# Patient Record
Sex: FEMALE | Race: WHITE | Employment: FULL TIME | URBAN - METROPOLITAN AREA
[De-identification: names, ages, dates, MRNs, and addresses within clinical notes are randomized per-mention and may not be internally consistent; named-entity substitution may affect disease eponyms.]

---

## 2020-10-26 ENCOUNTER — HOSPITAL ENCOUNTER (EMERGENCY)
Age: 30
Discharge: HOME | End: 2020-10-26
Attending: EMERGENCY MEDICINE
Payer: MEDICAID

## 2020-10-26 VITALS
BODY MASS INDEX: 25.8 KG/M2 | RESPIRATION RATE: 16 BRPM | SYSTOLIC BLOOD PRESSURE: 126 MMHG | WEIGHT: 140.21 LBS | OXYGEN SATURATION: 100 % | TEMPERATURE: 99 F | DIASTOLIC BLOOD PRESSURE: 82 MMHG | HEART RATE: 96 BPM | HEIGHT: 62 IN

## 2020-10-26 PROCEDURE — 99282 EMERGENCY DEPT VISIT SF MDM: CPT

## 2020-10-26 RX ORDER — LORAZEPAM 0.5 MG/1
TABLET ORAL EVERY 6 HOURS PRN
COMMUNITY

## 2020-10-26 RX ORDER — AZITHROMYCIN 250 MG/1
TABLET, FILM COATED ORAL
Qty: 1 PACKET | Refills: 0 | Status: SHIPPED | OUTPATIENT
Start: 2020-10-26 | End: 2020-10-30

## 2020-10-26 RX ORDER — BUPROPION HYDROCHLORIDE 300 MG/1
300 TABLET ORAL NIGHTLY
COMMUNITY
End: 2022-01-07

## 2020-10-26 RX ORDER — TRAZODONE HYDROCHLORIDE 300 MG/1
300 TABLET ORAL NIGHTLY PRN
COMMUNITY

## 2020-10-26 RX ORDER — SERTRALINE HYDROCHLORIDE 100 MG/1
200 TABLET, FILM COATED ORAL NIGHTLY
COMMUNITY
End: 2022-01-07

## 2020-10-26 ASSESSMENT — PAIN SCALES - GENERAL
PAINLEVEL_OUTOF10: 6
PAINLEVEL_OUTOF10: 1

## 2020-10-26 ASSESSMENT — PAIN DESCRIPTION - ORIENTATION
ORIENTATION: RIGHT;LEFT
ORIENTATION: RIGHT;LEFT

## 2020-10-26 ASSESSMENT — PAIN DESCRIPTION - DESCRIPTORS
DESCRIPTORS: ACHING
DESCRIPTORS: ACHING

## 2020-10-26 ASSESSMENT — PAIN DESCRIPTION - FREQUENCY
FREQUENCY: CONTINUOUS
FREQUENCY: CONTINUOUS

## 2020-10-26 ASSESSMENT — PAIN DESCRIPTION - PAIN TYPE
TYPE: ACUTE PAIN
TYPE: ACUTE PAIN

## 2020-10-26 ASSESSMENT — PAIN DESCRIPTION - LOCATION
LOCATION: EAR
LOCATION: EAR

## 2020-10-26 ASSESSMENT — PAIN - FUNCTIONAL ASSESSMENT: PAIN_FUNCTIONAL_ASSESSMENT: 0-10

## 2020-10-26 NOTE — ED PROVIDER NOTES
[facility]  eMERGENCY dEPARTMENT eNCOUnter      Pt Name: Camila Becker  MRN: [id number]  [place] 1990  Date of evaluation: 10/26/2020  Provider: Minnie Norman MD    [address] [place] [address]       Chief Complaint   Patient presents with   Wava Nip     c/o ear pain x 5 days. C/o headache. Denies fever. Vomited x 1 today. Denies n/v now          HISTORY OF PRESENT ILLNESS  (Location/Symptom, Timing/Onset, Context/Setting, Quality, Duration, Modifying Factors, Severity.)   Camila Becker is a 31 y.o. female who presents to the emergency department complaining of bilateral ear pain and pressure for about 5 days. She has some facial pressure and pain. She has had some nasal congestion. She has had yellow and clear drainage from her nose. She denies fever. She has had a headache. She vomited once today, but she is not nauseated anymore. No abdominal pain. Nursing Notes were reviewed and I agree. REVIEW OF SYSTEMS    (2-9 systems for level 4, 10 or more for level 5)     General: No fever or chills. ENT: Bilateral ear pain, nasal congestion. Respiratory: No shortness of breath or cough. GI: Nausea, vomited once earlier today. No abdominal pain. No diarrhea. Except as noted above the remainder of the review of systems was reviewed and negative. PAST MEDICAL HISTORY   History reviewed. No pertinent past medical history. SURGICAL HISTORY           Procedure Laterality Date    BRAIN SURGERY      HYSTERECTOMY         CURRENT MEDICATIONS       Previous Medications    BUPROPION (WELLBUTRIN XL) 300 MG EXTENDED RELEASE TABLET    Take 300 mg by mouth nightly    LORAZEPAM (ATIVAN) 0.5 MG TABLET    Take by mouth every 6 hours as needed. SERTRALINE (ZOLOFT) 100 MG TABLET    Take 200 mg by mouth nightly    TRAZODONE (DESYREL) 300 MG TABLET    Take 300 mg by mouth nightly as needed       ALLERGIES     Amoxicillin; Augmentin [amoxicillin-pot clavulanate];  Ceclor bilateral suppurative otitis media on exam.  She has some URI symptoms with nasal congestion. She has a benign abdomen, she had one episode of vomiting earlier today. She has no abdominal pain or tenderness. She has multiple antibiotic allergies. I put her on azithromycin for otitis media. I put her on some loratadine D for congestion and eustachian tube dysfunction. She will follow-up if not improved. She will return if worse or new symptoms develop. Diagnosis and treatment plan were discussed with the patient. She understands the treatment plan and follow-up as discussed. PROCEDURES:  None    FINAL IMPRESSION      1. Acute suppurative otitis media of both ears without spontaneous rupture of tympanic membranes, recurrence not specified          DISPOSITION/PLAN   DISPOSITION Decision To Discharge 10/26/2020 07:14:31 PM      PATIENT REFERRED TO:  [facility]) Pre-Services  [phone]          DISCHARGE MEDICATIONS:  New Prescriptions    AZITHROMYCIN (ZITHROMAX Z-MICAELA) 250 MG TABLET    Take 2 tablets (500 mg) on Day 1, and then take 1 tablet (250 mg) on days 2 through 5.     LORATADINE-PSEUDOEPHEDRINE (CLARITIN-D 12HR) 5-120 MG PER EXTENDED RELEASE TABLET    Take 1 tablet by mouth 2 times daily       (Please note that portions of this note were completed with a voice recognition program.  Efforts were made to edit the dictations but occasionally words are mis-transcribed.)    Jesus Sin MD  Attending Emergency Physician        Billy Torres MD  10/26/20 3339

## 2020-10-26 NOTE — ED NOTES
Gave patient discharge instructions. She states, understanding. Patient discharged to home      Kourtney Lackey RN  10/26/20 1931

## 2020-11-16 ENCOUNTER — HOSPITAL ENCOUNTER (EMERGENCY)
Age: 30
Discharge: HOME | End: 2020-11-16
Attending: EMERGENCY MEDICINE
Payer: MEDICAID

## 2020-11-16 VITALS
HEART RATE: 83 BPM | SYSTOLIC BLOOD PRESSURE: 114 MMHG | HEIGHT: 63 IN | TEMPERATURE: 98.3 F | WEIGHT: 142.8 LBS | OXYGEN SATURATION: 97 % | BODY MASS INDEX: 25.3 KG/M2 | DIASTOLIC BLOOD PRESSURE: 80 MMHG | RESPIRATION RATE: 17 BRPM

## 2020-11-16 PROCEDURE — U0003 INFECTIOUS AGENT DETECTION BY NUCLEIC ACID (DNA OR RNA); SEVERE ACUTE RESPIRATORY SYNDROME CORONAVIRUS 2 (SARS-COV-2) (CORONAVIRUS DISEASE [COVID-19]), AMPLIFIED PROBE TECHNIQUE, MAKING USE OF HIGH THROUGHPUT TECHNOLOGIES AS DESCRIBED BY CMS-2020-01-R: HCPCS

## 2020-11-16 PROCEDURE — U0002 COVID-19 LAB TEST NON-CDC: HCPCS

## 2020-11-16 PROCEDURE — 99283 EMERGENCY DEPT VISIT LOW MDM: CPT

## 2020-11-16 RX ORDER — CLINDAMYCIN HYDROCHLORIDE 300 MG/1
300 CAPSULE ORAL 3 TIMES DAILY
Qty: 21 CAPSULE | Refills: 0 | Status: SHIPPED | OUTPATIENT
Start: 2020-11-16 | End: 2020-11-23

## 2020-11-16 ASSESSMENT — PAIN DESCRIPTION - LOCATION
LOCATION: EAR
LOCATION: EAR

## 2020-11-16 ASSESSMENT — PAIN DESCRIPTION - FREQUENCY
FREQUENCY: CONTINUOUS
FREQUENCY: CONTINUOUS

## 2020-11-16 ASSESSMENT — PAIN DESCRIPTION - DESCRIPTORS
DESCRIPTORS: ACHING
DESCRIPTORS: ACHING

## 2020-11-16 ASSESSMENT — PAIN DESCRIPTION - PAIN TYPE
TYPE: ACUTE PAIN
TYPE: ACUTE PAIN

## 2020-11-16 ASSESSMENT — PAIN SCALES - GENERAL
PAINLEVEL_OUTOF10: 3
PAINLEVEL_OUTOF10: 3

## 2020-11-16 ASSESSMENT — PAIN - FUNCTIONAL ASSESSMENT: PAIN_FUNCTIONAL_ASSESSMENT: ACTIVITIES ARE NOT PREVENTED

## 2020-11-16 ASSESSMENT — PAIN DESCRIPTION - ORIENTATION
ORIENTATION: RIGHT
ORIENTATION: RIGHT

## 2020-11-16 NOTE — ED PROVIDER NOTES
CHIEF COMPLAINT  Otalgia (right ear pain. Just finished recent round of antibiotics for bilateral ear infection.) and Concern For COVID-19 (Lost her sense of taste and smell.)      HISTORY OF PRESENT ILLNESS  Laila Wheeler is a 31 y.o. female who presents to the ED complaining of right ear pain this been present over the past few days. Patient states that she has history of recurrent ear infections completed a course of antibiotics 1 week ago. Patient states that she has had throbbing pain in her right ear consistently over the past week. No drainage from the ear. Denies any fevers or chills. No nausea or vomiting. No chest pain or shortness of breath. States she has had decreased taste and smell over the past day. 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  Patient states that her work told her that she had to go to an urgent care to get a Covid test.  Denies any neck pain or headaches. No vision changes. No other complaints, modifying factors or associated symptoms. Nursing notes reviewed. History reviewed. No pertinent past medical history. Past Surgical History:   Procedure Laterality Date    BRAIN SURGERY      HYSTERECTOMY       History reviewed. No pertinent family history.   Social History     Socioeconomic History    Marital status: Single     Spouse name: Not on file    Number of children: Not on file    Years of education: Not on file    Highest education level: Not on file   Occupational History    Not on file   Social Needs    Financial resource strain: Not on file    Food insecurity     Worry: Not on file     Inability: Not on file    Transportation needs     Medical: Not on file     Non-medical: Not on file   Tobacco Use    Smoking status: Never Smoker    Smokeless tobacco: Never Used   Substance and Sexual Activity    Alcohol use: Yes     Comment: socially  Drug use: No    Sexual activity: Not Currently   Lifestyle    Physical activity     Days per week: Not on file     Minutes per session: Not on file    Stress: Not on file   Relationships    Social connections     Talks on phone: Not on file     Gets together: Not on file     Attends Quaker service: Not on file     Active member of club or organization: Not on file     Attends meetings of clubs or organizations: Not on file     Relationship status: Not on file    Intimate partner violence     Fear of current or ex partner: Not on file     Emotionally abused: Not on file     Physically abused: Not on file     Forced sexual activity: Not on file   Other Topics Concern    Not on file   Social History Narrative    Not on file     No current facility-administered medications for this encounter. Current Outpatient Medications   Medication Sig Dispense Refill    clindamycin (CLEOCIN) 300 MG capsule Take 1 capsule by mouth 3 times daily for 7 days 21 capsule 0    buPROPion (WELLBUTRIN XL) 300 MG extended release tablet Take 300 mg by mouth nightly      traZODone (DESYREL) 300 MG tablet Take 300 mg by mouth nightly as needed      sertraline (ZOLOFT) 100 MG tablet Take 200 mg by mouth nightly      loratadine-pseudoephedrine (CLARITIN-D 12HR) 5-120 MG per extended release tablet Take 1 tablet by mouth 2 times daily 20 tablet 0    LORazepam (ATIVAN) 0.5 MG tablet Take by mouth every 6 hours as needed.        Allergies   Allergen Reactions    Amoxicillin     Augmentin [Amoxicillin-Pot Clavulanate] Hives    Ceclor [Cefaclor]     Pcn [Penicillins]     Sumatriptan Other (See Comments)     Elevated heart rate    Prochlorperazine Anxiety         REVIEW OF SYSTEMS  10 systems reviewed, pertinent positives per HPI otherwise noted to be negative    PHYSICAL EXAM  /80   Pulse 83   Temp 98.3 °F (36.8 °C) (Oral)   Resp 17   Ht 5' 2.5\" (1.588 m)   Wt 142 lb 12.8 oz (64.8 kg)   LMP 03/02/2012   SpO2 97%   BMI 25.70 kg/m²      CONSTITUTIONAL: AOx4, cooperative with exam, afebrile   HEAD: normocephalic, atraumatic   EYES: PERRL, EOMI, anicteric sclera   ENT: Moist mucous membranes, uvula midline, no swelling of the posterior pharynx, right TM bulging with fluid behind it and mild erythema of the canal, no external ear tenderness bilaterally   NECK: Supple, symmetric, trachea midline, no meningismus   LUNGS: Bilateral breath sounds, CTAB, no rales/ronchi/wheezes   CARDIOVASCULAR: RRR, normal S1/S2, no m/r/g, 2+ pulses throughout   ABDOMEN: Soft, non-tender, non-distended, +BS   NEUROLOGIC:  MAEx4, GCS 15   MUSCULOSKELETAL: No clubbing, cyanosis or edema   SKIN: No rash, pallor or wounds on exposed surfaces         RADIOLOGY  X-RAYS:  I have reviewed radiologic plain film image(s). ALL OTHER NON-PLAIN FILM IMAGES SUCH AS CT, ULTRASOUND AND MRI HAVE BEEN READ BY THE RADIOLOGIST. No orders to display          EKG INTERPRETATION  None    PROCEDURES    ED COURSE/MDM  Otitis media, otitis externa, eustachian tube dysfunction  COVID-19, viral syndrome, other  Patient seen and evaluated. History and physical as above. Nontoxic, afebrile. Patient with right otitis media on physical exam.  Does have a history of recurrent otitis media and recent antibiotic use. Will start patient on clindamycin and stressed the importance of outpatient follow-up with her ENT specialist.  Patient agreeable. Patient also provided PCP referral.  COVID-19 test performed today in the emergency room. Patient instructed to self isolate till she finds out the results of her testing. Discussed and well-hydrated. Patient states understanding. Return instruction provided. All questions answered prior to discharge.         I estimate there is LOW risk for ACUTE CORONARY SYNDROME, CHRONIC OBSTRUCTIVE PULMONARY DISEASE, PERICARDIAL TAMPONADE, PNEUMONIA, PULMONARY EMBOLISM, SEPSIS, and AIRWAY COMPROMISE,  thus I consider the discharge disposition reasonable. Chaya Snow and I have discussed the diagnosis and risks, and we agree with discharging home to follow-up with their primary doctor. We also discussed returning to the Emergency Department immediately if new or worsening symptoms occur. We have discussed the symptoms which are most concerning (e.g., bloody sputum, fever, worsening pain or shortness of breath, vomiting) that necessitate immediate return. Patient was given scripts for the following medications. I counseled patient how to take these medications. New Prescriptions    CLINDAMYCIN (CLEOCIN) 300 MG CAPSULE    Take 1 capsule by mouth 3 times daily for 7 days           CLINICAL IMPRESSION  1. Recurrent acute suppurative otitis media of right ear without spontaneous rupture of tympanic membrane    2. Loss of taste        Blood pressure 114/80, pulse 83, temperature 98.3 °F (36.8 °C), temperature source Oral, resp. rate 17, height 5' 2.5\" (1.588 m), weight 142 lb 12.8 oz (64.8 kg), last menstrual period 03/02/2012, SpO2 97 %. DISPOSITION  Patient was discharged to home in good condition. Megan Grubbsmorenaldo  [phone]  Call today  For a follow up appointment. Disclaimer: All medical record entries made by [employer] dictation.       (Please note that this note was completed with a voice recognition program. Every attempt was made to edit the dictations, but inevitably there remain words that are mis-transcribed.)           Ashley Calles MD  11/16/20 5827

## 2020-11-16 NOTE — LETTER
[facility] [address]  Phone: [phone]               November 16, 2020    Patient: Hugh Hernández   YOB: 1990   Date of Visit: 11/16/2020       To Whom It May Concern:    Aretha Obando was seen and treated in our emergency department on 11/16/2020. She may not return to work until her Covid test is resulted. If it is positive, she must be off work a minimum of 10 days.       Sincerely,       Dr. Melissa Francisco        Signature:__________________________________

## 2020-11-17 ENCOUNTER — CARE COORDINATION (OUTPATIENT)
Dept: CARE COORDINATION | Age: 30
End: 2020-11-17

## 2020-11-17 LAB — SARS-COV-2, PCR: NOT DETECTED

## 2020-11-17 NOTE — CARE COORDINATION
Attempted outreach call for ED f/u and COVID-19 monitoring; left a VM with [facility] call-back information. Her COVID test is still PENDING at this time. No future appointments. Rasheeda YOUNG, RN  Ambulatory Care Manager  [phone]  [email or web address]

## 2020-11-17 NOTE — CARE COORDINATION
Patient contacted regarding recent discharge and COVID-19 risk. Discussed COVID-19 related testing which was available at this time. Test results were negative. Patient informed of results, if available? Yes     Care Transition Nurse/ Ambulatory Care Manager contacted the patient by telephone to perform post discharge assessment. Verified name and  with patient as identifiers. She is relieved to hear that her COVID test was negative. She is going to look on [employer] and send the results to her employer. Denies any new symptoms today. She is taking clindamycin for her ear infection. Patient has following risk factors of: no known risk factors. CTN/ACM reviewed discharge instructions, medical action plan and red flags related to discharge diagnosis. Reviewed and educated them on any new and changed medications related to discharge diagnosis. Advised obtaining a 90-day supply of all daily and as-needed medications. Education provided regarding infection prevention, and signs and symptoms of COVID-19 and when to seek medical attention with patient who verbalized understanding. Discussed exposure protocols and quarantine from [address] you at higher risk for severe illness  and given an opportunity for questions and concerns. The patient agrees to contact the COVID-19 hotline [phone] or PCP office for questions related to their healthcare. CTN/ACM provided contact information for future reference. From CDC: Are you at higher risk for severe illness?  Wash your hands often.  Avoid close contact (6 feet, which is about two arm lengths) with people who are sick.  Put distance between yourself and other people if COVID-19 is spreading in your community.  Clean and disinfect frequently touched surfaces.  Avoid all cruise travel and non-essential air travel.    Call your healthcare professional if you have concerns about COVID-19 and your underlying condition or if you are sick.    For more information on steps you can take to protect yourself, see CDC's How to [address] [facility] for follow-up call in 5-7 days based on severity of symptoms and risk factors. No future appointments. Maureen YOUNG, RN  Ambulatory Care Manager  [phone]  [email or web address]

## 2020-11-23 ENCOUNTER — CARE COORDINATION (OUTPATIENT)
Dept: CARE COORDINATION | Age: 30
End: 2020-11-23

## 2020-11-23 NOTE — CARE COORDINATION
Attempted outreach call; left a VM with [facility] call-back information. No future appointments. Corinne YOUNG, RN  Ambulatory Care Manager  [phone]  [email or web address]

## 2020-12-01 ENCOUNTER — CARE COORDINATION (OUTPATIENT)
Dept: CARE COORDINATION | Age: 30
End: 2020-12-01

## 2020-12-01 NOTE — CARE COORDINATION
You Patient resolved from the Care Transitions episode on 12/1/20  Discussed COVID-19 related testing which was available at this time. Test results were negative. Patient informed of results, if available? Yes    Patient/family has been provided the following resources and education related to COVID-19:                         Signs, symptoms and red flags related to COVID-19            CDC exposure and quarantine guidelines            [employer] exposure contact - [phone]            Contact for their local Department of Health                 Patient currently reports that the following symptoms have improved:  final outreach; left another  with ACM call back information. No further outreach scheduled with this CTN/ACM. Episode of Care resolved. Patient has this CTN/ACM contact information if future needs arise. No future appointments. Marcheta Click MSN, RN  Ambulatory Care Manager  [phone]  [email or web address]

## 2022-01-07 ENCOUNTER — HOSPITAL ENCOUNTER (EMERGENCY)
Age: 32
Discharge: HOME | End: 2022-01-07
Attending: EMERGENCY MEDICINE
Payer: COMMERCIAL

## 2022-01-07 VITALS
HEIGHT: 63 IN | BODY MASS INDEX: 27.81 KG/M2 | RESPIRATION RATE: 18 BRPM | SYSTOLIC BLOOD PRESSURE: 121 MMHG | HEART RATE: 71 BPM | OXYGEN SATURATION: 98 % | WEIGHT: 156.97 LBS | DIASTOLIC BLOOD PRESSURE: 82 MMHG | TEMPERATURE: 98.5 F

## 2022-01-07 DIAGNOSIS — G43.809 OTHER MIGRAINE WITHOUT STATUS MIGRAINOSUS, NOT INTRACTABLE: Primary | ICD-10-CM

## 2022-01-07 PROCEDURE — 6360000002 HC RX W HCPCS: Performed by: EMERGENCY MEDICINE

## 2022-01-07 PROCEDURE — 96374 THER/PROPH/DIAG INJ IV PUSH: CPT

## 2022-01-07 PROCEDURE — 2580000003 HC RX 258: Performed by: EMERGENCY MEDICINE

## 2022-01-07 PROCEDURE — 96375 TX/PRO/DX INJ NEW DRUG ADDON: CPT

## 2022-01-07 PROCEDURE — 99285 EMERGENCY DEPT VISIT HI MDM: CPT

## 2022-01-07 RX ORDER — PROCHLORPERAZINE EDISYLATE 5 MG/ML
10 INJECTION INTRAMUSCULAR; INTRAVENOUS ONCE
Status: COMPLETED | OUTPATIENT
Start: 2022-01-07 | End: 2022-01-07

## 2022-01-07 RX ORDER — 0.9 % SODIUM CHLORIDE 0.9 %
1000 INTRAVENOUS SOLUTION INTRAVENOUS ONCE
Status: COMPLETED | OUTPATIENT
Start: 2022-01-07 | End: 2022-01-07

## 2022-01-07 RX ORDER — DIPHENHYDRAMINE HYDROCHLORIDE 50 MG/ML
12.5 INJECTION INTRAMUSCULAR; INTRAVENOUS ONCE
Status: COMPLETED | OUTPATIENT
Start: 2022-01-07 | End: 2022-01-07

## 2022-01-07 RX ORDER — KETOROLAC TROMETHAMINE 30 MG/ML
15 INJECTION, SOLUTION INTRAMUSCULAR; INTRAVENOUS ONCE
Status: COMPLETED | OUTPATIENT
Start: 2022-01-07 | End: 2022-01-07

## 2022-01-07 RX ADMIN — PROCHLORPERAZINE EDISYLATE 10 MG: 5 INJECTION INTRAMUSCULAR; INTRAVENOUS at 21:35

## 2022-01-07 RX ADMIN — DIPHENHYDRAMINE HYDROCHLORIDE 12.5 MG: 50 INJECTION, SOLUTION INTRAMUSCULAR; INTRAVENOUS at 21:31

## 2022-01-07 RX ADMIN — KETOROLAC TROMETHAMINE 15 MG: 30 INJECTION, SOLUTION INTRAMUSCULAR at 21:33

## 2022-01-07 RX ADMIN — SODIUM CHLORIDE 1000 ML: 9 INJECTION, SOLUTION INTRAVENOUS at 21:30

## 2022-01-07 ASSESSMENT — PAIN DESCRIPTION - LOCATION: LOCATION: HEAD

## 2022-01-07 ASSESSMENT — PAIN DESCRIPTION - ONSET: ONSET: PROGRESSIVE

## 2022-01-07 ASSESSMENT — PAIN DESCRIPTION - FREQUENCY: FREQUENCY: CONTINUOUS

## 2022-01-07 ASSESSMENT — PAIN DESCRIPTION - DESCRIPTORS: DESCRIPTORS: POUNDING

## 2022-01-07 ASSESSMENT — PAIN DESCRIPTION - PROGRESSION: CLINICAL_PROGRESSION: GRADUALLY WORSENING

## 2022-01-07 ASSESSMENT — PAIN SCALES - GENERAL
PAINLEVEL_OUTOF10: 5
PAINLEVEL_OUTOF10: 5

## 2022-01-07 ASSESSMENT — PAIN DESCRIPTION - PAIN TYPE: TYPE: ACUTE PAIN

## 2022-01-07 ASSESSMENT — PAIN - FUNCTIONAL ASSESSMENT: PAIN_FUNCTIONAL_ASSESSMENT: PREVENTS OR INTERFERES SOME ACTIVE ACTIVITIES AND ADLS

## 2022-01-08 NOTE — ED TRIAGE NOTES
Patient ambulatory to Room 5 with c/o migraine headache. Patient states she has a history of migraines,but has not had one in a while. She states this episode started yesterday, went away briefly yesterday after taking excedrin, but came back again today. Patient reports that she took 1400 mg of ibuprofen and also excedrin at noon, then ativan one hour later. Patient reports light and sound sensitivity with nausea. She is awake, alert, oriented, respirations easy & regular, skin w/d, MMM & pink, cap refill brisk. Patient's mother at bedside.

## 2022-01-08 NOTE — ED PROVIDER NOTES
CHIEF COMPLAINT  Chief Complaint   Patient presents with    Migraine     States history of migraines, this episode started yesterday, has taken 1400 mg ibuprofen and excedrin at noon and ativan 1 hour later. Light and sound sensitivity and nausea       HISTORY OF PRESENT ILLNESS  Anival Harmon is a 27 y.o. female who presents to the ED complaining of a 1 day history of bilateral frontal headache typical for her usual headaches as she does have a history of migraines since a teenager. This headache is usual for her and that has been associated with light and sound sensitivity as well as nausea but patient denies any neck stiffness. No fever. No arm or leg paresthesias or weakness. No incontinence or ataxia. 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 No other complaints, modifying factors or associated symptoms. Nursing notes reviewed. Past Medical History:   Diagnosis Date    Anxiety     Depression     Migraine      Past Surgical History:   Procedure Laterality Date    BRAIN SURGERY      HYSTERECTOMY       History reviewed. No pertinent family history.   Social History     Socioeconomic History    Marital status: Single     Spouse name: Not on file    Number of children: Not on file    Years of education: Not on file    Highest education level: Not on file   Occupational History    Not on file   Tobacco Use    Smoking status: Never Smoker    Smokeless tobacco: Never Used   Vaping Use    Vaping Use: Never used   Substance and Sexual Activity    Alcohol use: Yes     Comment: socially    Drug use: No    Sexual activity: Not Currently   Other Topics Concern    Not on file   Social History Narrative    Not on file     Social Determinants of Health     Financial Resource Strain:     Difficulty of Paying Living Expenses: Not on file   Food Insecurity:     Worried About Running Out of Food in the Last Year: Not on file    Ran Out of Food in the Last Year: Not on file   Transportation Needs:     Lack of Transportation (Medical): Not on file    Lack of Transportation (Non-Medical): Not on file   Physical Activity:     Days of Exercise per Week: Not on file    Minutes of Exercise per Session: Not on file   Stress:     Feeling of Stress : Not on file   Social Connections:     Frequency of Communication with Friends and Family: Not on file    Frequency of Social Gatherings with Friends and Family: Not on file    Attends Taoism Services: Not on file    Active Member of [address] [place] [address] [employer] or Organizations: Not on file    Attends Club or Organization Meetings: Not on file    Marital Status: Not on file   Intimate Partner Violence:     Fear of Current or Ex-Partner: Not on file    Emotionally Abused: Not on file    Physically Abused: Not on file    Sexually Abused: Not on file   Housing Stability:     Unable to Pay for Housing in the Last Year: Not on file    Number of [place] in the Last Year: Not on file    Unstable Housing in the Last Year: Not on file     No current facility-administered medications for this encounter. Current Outpatient Medications   Medication Sig Dispense Refill    Venlafaxine HCl (EFFEXOR XR PO) Take 150 mg by mouth      traZODone (DESYREL) 300 MG tablet Take 300 mg by mouth nightly as needed      LORazepam (ATIVAN) 0.5 MG tablet Take by mouth every 6 hours as needed.  loratadine-pseudoephedrine (CLARITIN-D 12HR) 5-120 MG per extended release tablet Take 1 tablet by mouth 2 times daily 20 tablet 0     Allergies   Allergen Reactions    Amoxicillin     Augmentin [Amoxicillin-Pot Clavulanate] Hives    Ceclor [Cefaclor]     Pcn [Penicillins]     Sumatriptan Other (See Comments)     Elevated heart rate    Prochlorperazine Anxiety       REVIEW OF SYSTEMS  Positives and pertinent negatives as per HPI.   Six other systems were reviewed and are negative. Nursing notes pertaining to ROS were reviewed. PHYSICAL EXAM   /72   Pulse 69   Temp 98.7 °F (37.1 °C) (Tympanic)   Resp 16   Ht 5' 3\" (1.6 m)   Wt 156 lb 15.5 oz (71.2 kg)   LMP 03/02/2012   SpO2 96%   BMI 27.81 kg/m²   General: Alert and oriented x 3, NAD. No increased work of breathing or accessory muscle use. Non-ill appearing. Appropriate and interactive  Eyes: PERRL, no scleral icterus, injection or exudate. EOMI. HENT: Atraumatic. Oral pharynx is clear, moist, no enanthem. No tonsillar hypertropy or exudate. Nasal mucous membranes are clear. TM's are clear without evidence of otitis media. Neck:  No Lymphadenopathy. Non-tender to palpation. Normal ROM. No JVD. No thyromegaly. No Mass. PULMONARY: Lungs clear bilaterally without wheezes, rales or rhonchi. Good air movement bilaterally. CV: Regular rate and rhythm without murmurs, rubs or gallops. ABD: Soft, non-tender, non-distended, normal bowel sounds, no hepatosplenomegaly, no masses. No peritoneal signs, rebound or guarding. Back:  No CVAT, no rash. EXT: No cyanosis or clubbing. No rash. CR < 2 seconds. No tenderness to palpation. No lower extremity edema. +2 pulses in upper/lower extremities bilaterally. Skin is warm and dry. PSYCH: normal affect  NEURO: Alert and oriented x 3, NAD. Interactive. GCS 15.  CN 2-12 are intact. PERRL. EOMI. Visual fields are normal.  Fundi are sharp without papilledema. 5 of 5 LE strength that is bilaterally symmetric.  5 of 5 UE strength that is bilaterally symmetric. Normal sensation to light touch of the UE and LE.  2/4 DTR of the biceps, patellar and Achilles tendons. No clonus. Normal Romberg without pronator drift. Normal finger to nose testing without past pointing. No ataxia or truncal instability.       ED COURSE/MDM  Migraine headache without red flag symptoms, typical for her usual exacerbations with no new increasing frequency or severity. No new neurologic symptoms. No ataxia or incontinence. Patient was given IV Compazine and Toradol with IV fluids and did feel markedly better. Patient was advised to return to emergency for any worsening or progressive symptoms. Based on the patient's history, exam and evaluation, I believe the patient is at low risk for meningitis, SAH, Head injury, cluster headache, ICH, mass, CVA, encephalitis, acute intracranial thrombosis and the patient can be safely discharged home. We also discussed returning to the Emergency Department immediately if new or worsening symptoms occur. Patient was given scripts for the following medications. I counseled patient how to take these medications. New Prescriptions    No medications on file         CLINICAL IMPRESSION  1. Other migraine without status migrainosus, not intractable        Blood pressure 108/72, pulse 69, temperature 98.7 °F (37.1 °C), temperature source Tympanic, resp. rate 16, height 5' 3\" (1.6 m), weight 156 lb 15.5 oz (71.2 kg), last menstrual period 03/02/2012, SpO2 96 %.       Follow-up with:  [address] [place][note 2]              Collin Valladares MD  01/07/22 4392

## 2022-04-21 ENCOUNTER — HOSPITAL ENCOUNTER (EMERGENCY)
Age: 32
Discharge: HOME | End: 2022-04-21
Payer: COMMERCIAL

## 2022-04-21 ENCOUNTER — APPOINTMENT (OUTPATIENT)
Dept: GENERAL RADIOLOGY | Age: 32
End: 2022-04-21
Payer: COMMERCIAL

## 2022-04-21 VITALS
OXYGEN SATURATION: 97 % | TEMPERATURE: 98.4 F | DIASTOLIC BLOOD PRESSURE: 74 MMHG | BODY MASS INDEX: 26.36 KG/M2 | SYSTOLIC BLOOD PRESSURE: 114 MMHG | WEIGHT: 148.81 LBS | HEART RATE: 81 BPM | RESPIRATION RATE: 16 BRPM

## 2022-04-21 DIAGNOSIS — S93.402A SPRAIN OF LEFT ANKLE, UNSPECIFIED LIGAMENT, INITIAL ENCOUNTER: Primary | ICD-10-CM

## 2022-04-21 PROCEDURE — 99283 EMERGENCY DEPT VISIT LOW MDM: CPT

## 2022-04-21 PROCEDURE — 73610 X-RAY EXAM OF ANKLE: CPT

## 2022-04-21 PROCEDURE — 73630 X-RAY EXAM OF FOOT: CPT

## 2022-04-21 RX ORDER — IBUPROFEN 800 MG/1
800 TABLET ORAL EVERY 8 HOURS PRN
Qty: 30 TABLET | Refills: 0 | Status: SHIPPED | OUTPATIENT
Start: 2022-04-21

## 2022-04-21 ASSESSMENT — ENCOUNTER SYMPTOMS
ABDOMINAL PAIN: 0
SHORTNESS OF BREATH: 0
NAUSEA: 0
SORE THROAT: 0
VOMITING: 0
COUGH: 0
EYE PAIN: 0
BACK PAIN: 0

## 2022-04-21 NOTE — ED PROVIDER NOTES
**ADVANCED PRACTICE PROVIDER, I HAVE EVALUATED THIS PATIENT**        [address]      Pt Name: Abraham Perera  CER:[id number]  Martin 1990  Date of evaluation: 4/21/2022  Provider: Aleyda Elizabeth PA-C      Chief Complaint:  No chief complaint on file. Nursing Notes, Past Medical Hx, Past Surgical Hx, Social Hx, Allergies, and Family Hx were all reviewed and agreed with or any disagreements were addressed in the HPI.    HPI: (Location, Duration, Timing, Severity, Quality, Assoc Sx, Context, Modifying factors)    Chief Complaint of left ankle and left foot pain. States he felt on step coming out of her apartment or house last night. Twisted left ankle. Denies any numbness or tingling. Does complain of pain with weightbearing noticeable swelling. Denies knee pain, no hip pain. No other injuries to any other extremity. No loss of consciousness. No chest pain or abdominal pain. No lightheaded or dizziness. No weakness. No other complaints. This is a  34 y.o. female who presents to emergency room with above complaint. PastMedical/Surgical History:      Diagnosis Date    Anxiety     Depression     Migraine          Procedure Laterality Date    BRAIN SURGERY      HYSTERECTOMY         Medications:  Previous Medications    LORATADINE-PSEUDOEPHEDRINE (CLARITIN-D 12HR) 5-120 MG PER EXTENDED RELEASE TABLET    Take 1 tablet by mouth 2 times daily    LORAZEPAM (ATIVAN) 0.5 MG TABLET    Take by mouth every 6 hours as needed. TRAZODONE (DESYREL) 300 MG TABLET    Take 300 mg by mouth nightly as needed    VENLAFAXINE HCL (EFFEXOR XR PO)    Take 150 mg by mouth         Review of Systems:  (2-9 systems needed)  Review of Systems   Constitutional: Negative for chills and fever. HENT: Negative for congestion and sore throat. Eyes: Negative for pain and visual disturbance.    Respiratory: Negative for cough and shortness of breath. Cardiovascular: Negative for chest pain and leg swelling. Gastrointestinal: Negative for abdominal pain, nausea and vomiting. Genitourinary: Negative for dysuria and frequency. Musculoskeletal: Negative for back pain and neck pain. Skin: Negative for rash and wound. Neurological: Negative for dizziness and light-headedness. \"Positives and Pertinent negatives as per HPI\"    Physical Exam:  Physical Exam  Vitals and nursing note reviewed. Cardiovascular:      Rate and Rhythm: Normal rate and regular rhythm. Heart sounds: Normal heart sounds. No murmur heard. No friction rub. No gallop. Pulmonary:      Effort: Pulmonary effort is normal. No respiratory distress. Breath sounds: Normal breath sounds. No wheezing or rales. Chest:      Chest wall: No tenderness. Musculoskeletal:      Left ankle: Swelling and ecchymosis present. No deformity. Tenderness present over the lateral malleolus and medial malleolus. No proximal fibula tenderness. Decreased range of motion. Anterior drawer test negative. Normal pulse. Left Achilles Tendon: Normal.      Left foot: Normal range of motion and normal capillary refill. Tenderness and bony tenderness present. No swelling, deformity or crepitus. Normal pulse. Feet:          MEDICAL DECISION MAKING    Vitals:    Vitals:    04/21/22 1017   BP: 114/74   Pulse: 81   Resp: 16   Temp: 98.4 °F (36.9 °C)   TempSrc: Oral   SpO2: 97%   Weight: 148 lb 13 oz (67.5 kg)       LABS:Labs Reviewed - No data to display     Remainder of labs reviewed and were negative at this time or not returned at the time of this note.     RADIOLOGY:   Non-plain film images such as CT, Ultrasound and MRI are read by the radiologist. Beverley Romo PA-C have directly visualized the radiologic plain film image(s) with the below findings:      Interpretation per the Radiologist below, if available at the time of this note:    XR ANKLE LEFT (MIN 3 VIEWS)   Final Result   No acute abnormality of the ankle. XR FOOT LEFT (MIN 3 VIEWS)   Final Result   No acute osseous abnormality of the left foot. XR ANKLE LEFT (MIN 3 VIEWS)    Result Date: 4/21/2022  EXAMINATION: THREE XRAY VIEWS OF THE LEFT ANKLE 4/21/2022 10:58 am COMPARISON: None. HISTORY: ORDERING SYSTEM PROVIDED HISTORY: Injury TECHNOLOGIST PROVIDED HISTORY: Reason for exam:->Injury Reason for Exam: Injury, fell down steps last night FINDINGS: No evidence of acute fracture or dislocation. Normal alignment of the ankle mortise. No focal osseous lesion. No evidence of joint effusion. There is moderate soft tissue swelling about the joint. No acute abnormality of the ankle. XR FOOT LEFT (MIN 3 VIEWS)    Result Date: 4/21/2022  EXAMINATION: THREE XRAY VIEWS OF THE LEFT FOOT 4/21/2022 11:00 am COMPARISON: None. HISTORY: ORDERING SYSTEM PROVIDED HISTORY: Injury TECHNOLOGIST PROVIDED HISTORY: Reason for exam:->Injury Reason for Exam: Injury, fell down steps last night FINDINGS: No acute fracture or dislocation. Lisfranc alignment is maintained. Joint spaces are preserved with no arthritic changes. No focal soft tissue abnormality. No acute osseous abnormality of the left foot. MEDICAL DECISION MAKING / ED COURSE:      PROCEDURES:   Procedures    None    Patient was given:  Medications - No data to display    Emergency room course: Patient on exam cardiovascular regular rhythm, lungs are clear no wheeze rales or rhonchi noted. Patient left lower extremity hip, knee show no tenderness No Tenderness. She Has Mild Tenderness on around the Ankle Both Medial and Lateral Aspect. She Is Nontender Achilles. She Has Good Plantar and Dorsiflexion without Discomfort. Pain with internal and lateral bending. Tenderness over the base of the fifth metatarsal.  Capillary refill less than 2 seconds all digits pedal pulse good at 2+.     X-ray of the left ankle show no acute osseous

## 2023-04-03 ENCOUNTER — HOSPITAL ENCOUNTER (EMERGENCY)
Age: 33
Discharge: HOME | End: 2023-04-03
Attending: EMERGENCY MEDICINE
Payer: COMMERCIAL

## 2023-04-03 ENCOUNTER — APPOINTMENT (OUTPATIENT)
Dept: GENERAL RADIOLOGY | Age: 33
End: 2023-04-03
Payer: COMMERCIAL

## 2023-04-03 DIAGNOSIS — S99.912A LEFT ANKLE INJURY, INITIAL ENCOUNTER: Primary | ICD-10-CM

## 2023-04-03 PROCEDURE — 73630 X-RAY EXAM OF FOOT: CPT

## 2023-04-03 PROCEDURE — 73610 X-RAY EXAM OF ANKLE: CPT

## 2023-04-03 PROCEDURE — 6370000000 HC RX 637 (ALT 250 FOR IP): Performed by: EMERGENCY MEDICINE

## 2023-04-03 RX ORDER — ACETAMINOPHEN 500 MG
1000 TABLET ORAL ONCE
Status: COMPLETED | OUTPATIENT
Start: 2023-04-03 | End: 2023-04-03

## 2023-04-03 RX ORDER — NAPROXEN 375 MG/1
375 TABLET ORAL 2 TIMES DAILY WITH MEALS
Qty: 10 TABLET | Refills: 0 | Status: SHIPPED | OUTPATIENT
Start: 2023-04-03 | End: 2023-04-08

## 2023-04-03 RX ORDER — ACETAMINOPHEN 500 MG
1000 TABLET ORAL 3 TIMES DAILY
Qty: 30 TABLET | Refills: 0 | Status: SHIPPED | OUTPATIENT
Start: 2023-04-03 | End: 2023-04-08

## 2023-04-03 RX ORDER — LIDOCAINE 4 G/G
1 PATCH TOPICAL DAILY PRN
Qty: 5 PATCH | Refills: 0 | Status: SHIPPED | OUTPATIENT
Start: 2023-04-03 | End: 2023-04-08

## 2023-04-03 RX ADMIN — ACETAMINOPHEN 1000 MG: 500 TABLET ORAL at 20:57

## 2023-04-03 ASSESSMENT — PAIN SCALES - GENERAL
PAINLEVEL_OUTOF10: 4
PAINLEVEL_OUTOF10: 4

## 2023-04-03 ASSESSMENT — PAIN - FUNCTIONAL ASSESSMENT: PAIN_FUNCTIONAL_ASSESSMENT: 0-10

## 2023-04-03 ASSESSMENT — PAIN DESCRIPTION - ORIENTATION
ORIENTATION: LEFT
ORIENTATION: LEFT

## 2023-04-03 ASSESSMENT — LIFESTYLE VARIABLES
HOW MANY STANDARD DRINKS CONTAINING ALCOHOL DO YOU HAVE ON A TYPICAL DAY: PATIENT DOES NOT DRINK
HOW OFTEN DO YOU HAVE A DRINK CONTAINING ALCOHOL: MONTHLY OR LESS

## 2023-04-03 ASSESSMENT — PAIN DESCRIPTION - LOCATION
LOCATION: FOOT
LOCATION: ANKLE

## 2023-04-03 NOTE — Clinical Note
Emily Joshi was seen and treated in our emergency department on 4/3/2023. She may return to work on 04/05/2023. If you have any questions or concerns, please don't hesitate to call.       Vitor Granda MD

## 2023-04-04 VITALS
DIASTOLIC BLOOD PRESSURE: 80 MMHG | BODY MASS INDEX: 30 KG/M2 | TEMPERATURE: 98.2 F | HEART RATE: 88 BPM | WEIGHT: 169.3 LBS | HEIGHT: 63 IN | SYSTOLIC BLOOD PRESSURE: 136 MMHG | RESPIRATION RATE: 18 BRPM | OXYGEN SATURATION: 98 %

## 2023-04-04 NOTE — ED NOTES
Patient instructed on \"RICE\", use of crutches and ankle brace. Verbalized understanding and returns demonstration. Discharge instructions reviewed with patient and verbalized understanding, denies further questions and successful teach back occurred. Offered wheelchair for discharge and declined. Discharged ambulatory on crutches with steady gait to ED [facility]. Written discharge instructions, work note, and referral for PCP provided to patient.      Rudolph Snell RN  04/04/23 0003

## 2023-04-04 NOTE — ED TRIAGE NOTES
Initial contact with patient at this time. Patient ambulatory to Room 10 with c/o left ankle injury. Patient reports she was walking her dog [place] the dog park, tripped and twisted her ankle. She states she heard a snap and had instant pain. She has iced her ankle and taken ibuprofen prior to coming to ER. Left ankle with pain and swelling. Cap refill brisk, pulses strong. She denies striking her head, no nausea or vomiting. She is awake and alert. Resps easy & regular, skin w/d, cap refill brisk.

## 2023-04-04 NOTE — DISCHARGE INSTRUCTIONS
Your prescription has been sent to [address] [place] [address]. Try to apply ice or cool packs to your ankle for 5 to 10 minutes 3-4 times per day. Be sure to contact the clinic listed in your paperwork to arrange for a follow up visit. A representative from the hospital should be contacting you to arrange for a follow up appointment in clinic. If your ankle is not improving readily over the next few days I would recommend that you undergo repeat imaging of your ankle next week. If you have any new or worsening issues after going home don't hesitate to return here for reevaluation at any time 24/7!

## 2023-04-16 ASSESSMENT — ENCOUNTER SYMPTOMS: BACK PAIN: 0

## 2023-12-30 ENCOUNTER — HOSPITAL ENCOUNTER (EMERGENCY)
Age: 33
Discharge: HOME | End: 2023-12-30
Attending: EMERGENCY MEDICINE
Payer: COMMERCIAL

## 2023-12-30 VITALS
OXYGEN SATURATION: 100 % | HEART RATE: 88 BPM | TEMPERATURE: 97.6 F | HEIGHT: 63 IN | RESPIRATION RATE: 18 BRPM | DIASTOLIC BLOOD PRESSURE: 88 MMHG | WEIGHT: 151.01 LBS | SYSTOLIC BLOOD PRESSURE: 118 MMHG | BODY MASS INDEX: 26.76 KG/M2
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
## 2023-12-30 LAB
HAV IGM SERPL QL IA: NORMAL
HBV CORE IGM SERPL QL IA: NORMAL
HBV SURFACE AB SERPL IA-ACNC: >1000 MIU/ML
HBV SURFACE AG SERPL QL IA: NORMAL
HCV AB SERPL QL IA: NORMAL

## 2023-12-30 PROCEDURE — 86702 HIV-2 ANTIBODY: CPT

## 2023-12-30 PROCEDURE — 36415 COLL VENOUS BLD VENIPUNCTURE: CPT

## 2023-12-30 PROCEDURE — 87390 HIV-1 AG IA: CPT

## 2023-12-30 PROCEDURE — 80074 ACUTE HEPATITIS PANEL: CPT

## 2023-12-30 PROCEDURE — 86706 HEP B SURFACE ANTIBODY: CPT

## 2023-12-30 PROCEDURE — 99282 EMERGENCY DEPT VISIT SF MDM: CPT

## 2023-12-30 PROCEDURE — 86701 HIV-1ANTIBODY: CPT

## 2023-12-30 ASSESSMENT — PAIN SCALES - GENERAL: PAINLEVEL_OUTOF10: 1

## 2023-12-30 ASSESSMENT — PAIN - FUNCTIONAL ASSESSMENT: PAIN_FUNCTIONAL_ASSESSMENT: 0-10

## 2023-12-30 NOTE — ED TRIAGE NOTES
Pt presents with needle stick while at work. Pt states she gave patient insulin and stuck herself with needle after.

## 2023-12-31 LAB
HIV 1+2 AB+HIV1 P24 AG SERPL QL IA: NORMAL
HIV 2 AB SERPL QL IA: NORMAL
HIV1 AB SERPL QL IA: NORMAL
HIV1 P24 AG SERPL QL IA: NORMAL